# Patient Record
Sex: FEMALE | Race: WHITE | NOT HISPANIC OR LATINO | Employment: FULL TIME | URBAN - METROPOLITAN AREA
[De-identification: names, ages, dates, MRNs, and addresses within clinical notes are randomized per-mention and may not be internally consistent; named-entity substitution may affect disease eponyms.]

---

## 2019-02-01 ENCOUNTER — OFFICE VISIT (OUTPATIENT)
Dept: OBSTETRICS AND GYNECOLOGY | Facility: CLINIC | Age: 29
End: 2019-02-01

## 2019-02-01 VITALS
HEIGHT: 64 IN | BODY MASS INDEX: 32.1 KG/M2 | DIASTOLIC BLOOD PRESSURE: 60 MMHG | SYSTOLIC BLOOD PRESSURE: 99 MMHG | WEIGHT: 188 LBS | HEART RATE: 58 BPM

## 2019-02-01 DIAGNOSIS — Z01.419 ENCOUNTER FOR GYNECOLOGICAL EXAMINATION: Primary | ICD-10-CM

## 2019-02-01 DIAGNOSIS — N97.9 SECONDARY FEMALE INFERTILITY: ICD-10-CM

## 2019-02-01 DIAGNOSIS — N73.0 PID (ACUTE PELVIC INFLAMMATORY DISEASE): ICD-10-CM

## 2019-02-01 DIAGNOSIS — B37.31 VAGINA, CANDIDIASIS: ICD-10-CM

## 2019-02-01 DIAGNOSIS — Z71.6 ENCOUNTER FOR TOBACCO USE CESSATION COUNSELING: ICD-10-CM

## 2019-02-01 DIAGNOSIS — Z12.4 SCREENING FOR CERVICAL CANCER: ICD-10-CM

## 2019-02-01 DIAGNOSIS — Z11.3 SCREEN FOR STD (SEXUALLY TRANSMITTED DISEASE): ICD-10-CM

## 2019-02-01 PROBLEM — Z86.19 HISTORY OF CHLAMYDIA: Status: ACTIVE | Noted: 2019-02-01

## 2019-02-01 PROCEDURE — 99213 OFFICE O/P EST LOW 20 MIN: CPT | Performed by: OBSTETRICS & GYNECOLOGY

## 2019-02-01 PROCEDURE — 99385 PREV VISIT NEW AGE 18-39: CPT | Performed by: OBSTETRICS & GYNECOLOGY

## 2019-02-01 PROCEDURE — 99406 BEHAV CHNG SMOKING 3-10 MIN: CPT | Performed by: OBSTETRICS & GYNECOLOGY

## 2019-02-01 PROCEDURE — 96372 THER/PROPH/DIAG INJ SC/IM: CPT | Performed by: OBSTETRICS & GYNECOLOGY

## 2019-02-01 RX ORDER — METRONIDAZOLE 500 MG/1
500 TABLET ORAL 2 TIMES DAILY
Qty: 28 TABLET | Refills: 0 | Status: SHIPPED | OUTPATIENT
Start: 2019-02-01 | End: 2019-02-15

## 2019-02-01 RX ORDER — CEFTRIAXONE SODIUM 250 MG/1
250 INJECTION, POWDER, FOR SOLUTION INTRAMUSCULAR; INTRAVENOUS ONCE
Status: COMPLETED | OUTPATIENT
Start: 2019-02-01 | End: 2019-02-01

## 2019-02-01 RX ORDER — DOXYCYCLINE HYCLATE 100 MG/1
100 CAPSULE ORAL 2 TIMES DAILY
Qty: 28 CAPSULE | Refills: 0 | Status: SHIPPED | OUTPATIENT
Start: 2019-02-01 | End: 2019-02-15

## 2019-02-01 RX ORDER — FLUCONAZOLE 150 MG/1
150 TABLET ORAL
Qty: 2 TABLET | Refills: 0 | Status: SHIPPED | OUTPATIENT
Start: 2019-02-01 | End: 2019-02-05

## 2019-02-01 RX ADMIN — CEFTRIAXONE SODIUM 250 MG: 250 INJECTION, POWDER, FOR SOLUTION INTRAMUSCULAR; INTRAVENOUS at 11:41

## 2019-02-01 NOTE — PROGRESS NOTES
Jose L Perry is a 28 y.o. female.   Chief complaint: Annual visit, establish care, infertility  History of Present Illness   Patient is here as a new patient to establish care.  She also has concerns about infertility.  [facility]  She says she last saw him about 2 years ago.  She states they started some workup for until infertility including some blood work and an ultrasound, but she did not want to continue care there.  She says her last Pap was at least 2 years ago.  She does have a history of a chlamydia infection about 4 years ago.  I was also was with her current partner.  She reports that they're both treated.  The patient denies any known history of abnormal Pap smears.  She currently smokes marijuana, as does her partner.  The patient reports problems with painful intercourse.  She reports mostly pain with deep penetration.  She states her periods are very regular.  They typically last about 2-3 days.  She has predictable signs leading up to her menses as well.  She has never tried any home ovulatory kits.  She states that her mother has a history of pelvic scarring that required surgery prior to conceiving her.  She is uncertain of her mother had endometriosis or not.  Patient reports that her partner is roughly 30 years old.  He is generally in good health but does smoke marijuana.  He has fathered children in the past.  The patient has had 1 pregnancy in the past with her current partner.  That pregnancy resulted in a miscarriage.    Obstetric History       T0      L0     SAB1   TAB0   Ectopic0   Molar0   Multiple0   Live Births0       # Outcome Date GA Lbr Janes/2nd Weight Sex Delivery Anes PTL Lv   1 SAB                 Past Medical History:   Diagnosis Date   • Chlamydia      History reviewed. No pertinent surgical history.     History reviewed. No pertinent family history.     Social History     Tobacco Use   • Smoking status:  "Current Some Day Smoker     Types: Electronic Cigarette   • Smokeless tobacco: Never Used   Substance Use Topics   • Alcohol use: Yes     Comment: rarely   • Drug use: Yes     Types: Marijuana     No current outpatient medications on file prior to visit.     No current facility-administered medications on file prior to visit.      No Known Allergies    Review of Systems  General: No fever or chills  Constitutional: No weight loss or gain, no hair loss  HENT: No headache, no hearing loss, no tinnitus  Eyes: normal vision, no eye pain  Lungs: No cough, no shortness of breath  Heart: No chest pain, no palpitations  Abdomen: No nausea, vomiting, constipation or diarrhea  : No dysuria, no hematuria  Skin: No rashes  Lymph: No swelling  Neuro: No parathesia, no weakness  Psych: Normal though content, no hallucinations, no SI/HI    Objective   Physical Exam  Vitals:    02/01/19 1042   BP: 99/60   Pulse: 58   Weight: 85.3 kg (188 lb)   Height: 162.6 cm (64\")     Exam performed in the presence of a female chaperone  Patient has provided verbal consent to proceed with exam.    Gen: No acute distress, awake and oriented times three  HENT: Normocephalic, atraumatic, Moist mucous membranes  Eyes: PERRLA, EOMI  Neck: Supple, normal range of motion, no thyromegaly  Lungs: Normal work of breathing, lungs clear bilaterally, no crackles/wheezes  Heart: Regular rate and rhythm, no murmurs  Breast: Symmetrical. No skin changes or nipple retractions. No lumps or masses bilaterally. No tenderness bilaterally.  Abdomen: soft, nontender, no masses or hernia, no hepatosplenomegaly, non distended, normoactive bowel sounds  Pelvic: Exam performed in the presence of a female chaperone  Patient has provided verbal consent to proceed with exam.  Normal external female genitalia, no lesions  Urethra: Normal meatus, no caruncle  Bladder: nontender  Vagina: No blood; yellow/mucopurulent discharge noted  Cervix: Positive cervical motion " tenderness, no lesions, no active bleeding, cervix is friable, mucopurulent discharge noted  Uterus: Anteverted, normal size and shape, mildly tender  Adnexa: No masses; there is mild tenderness in the right adnexa, there is more significant tenderness in the left adnexa  External anal exam: Normal appearance, no lesions or hemorrhoids  Rectal: Deferred  Skin: Warm and dry, no rashes  Psych: Good judgement and insight, normal affect and mood  Neuro: CN 2-12 intact, no gross deficits    Assessment/Plan   Chuyita was seen today for new patient.    Diagnoses and all orders for this visit:    Encounter for gynecological examination  Pap smear and STD cultures performed today.  Patient declines STD blood work.  Discussed the importance of self breast exams and yearly gynecologic exams.  Screening for cervical cancer  -     IGP,CtNgTv,rfx Apt HPV All    Screen for STD (sexually transmitted disease)  -     IGP,CtNgTv,rfx Apt HPV All    PID (acute pelvic inflammatory disease)  -     cefTRIAXone (ROCEPHIN) injection 250 mg; Inject 250 mg into the appropriate muscle as directed by prescriber 1 (One) Time.  -     metroNIDAZOLE (FLAGYL) 500 MG tablet; Take 1 tablet by mouth 2 (Two) Times a Day for 14 days.  -     doxycycline (VIBRAMYCIN) 100 MG capsule; Take 1 capsule by mouth 2 (Two) Times a Day for 14 days.  Patient's findings on exam today are concerning for pelvic inflammatory disease.  This could be a reason for her painful intercourse.  I would recommend immediate treatment today, even though the patient is just here for an annual exam.  Patient has a history of chlamydia infection years ago and may or may not have issues with endometriosis based on symptoms.  Patient could have some significant tubal adhesions as a result of either prior Chlamydia or possibly endometriosis that may put her at risk for hydrosalpinx or pelvic inflammatory disease.  We discussed the importance of continuing the full 2 weeks of therapy.  I  will see her back in about 2 weeks to see if her symptoms improve after treatment.  Vagina, candidiasis  -     fluconazole (DIFLUCAN) 150 MG tablet; Take 1 tablet by mouth Every 3 (Three) Days for 2 doses.    Patient has some concerns about fertility.  I explained that a full evaluation of her infertility issues warrants another visit to address.  Patient does have a risk factor for tubal factor infertility including previous chlamydia infections and possible PID today.  Best place for this patient to start may be with hysterosalpingogram, but I would recommend completing treatment for PID first.  We will consider possible evaluation with hysterosalpingogram after her next visit.    Patient does smoke marijuana.  I have counseled her today about the risks of smoking and the weighted smoking affects her fertility rates as well as her long-term health.  I advised the patient to discontinue the use of all tobacco and marijuana products.  Greater than 3 minutes was spent in tobacco cessation counseling efforts.    I advised Chuyita of the risks of continuing to use tobacco, and I provided her with tobacco cessation educational materials in the After Visit Summary.

## 2019-02-04 ENCOUNTER — TELEPHONE (OUTPATIENT)
Dept: OBSTETRICS AND GYNECOLOGY | Facility: CLINIC | Age: 29
End: 2019-02-04

## 2019-02-04 NOTE — TELEPHONE ENCOUNTER
I spoke with Charlene Gonzalez in billing trying to find out for the patient how much the hysterosalpingogram procedure would be. She said CPT codes would make for a more accurate estimate. I explained the test hasn't been officially ordered yet so I wasn't for sure if she was definitely going to have the procedure or if we were needing to get estimated costs to help determine if she would have the test. Also, would we be using PID as the primary diagnosis and infertility as the secondary diagnosis? She said after I get these procedure codes, I could e-mail Mirtha Chaparro in coding so she could help me with how much is the allowable amount on the patient's insurance.

## 2019-02-05 LAB
C TRACH RRNA CVX QL NAA+PROBE: NEGATIVE
CONV .: NORMAL
CYTOLOGIST CVX/VAG CYTO: NORMAL
CYTOLOGY CVX/VAG DOC THIN PREP: NORMAL
DX ICD CODE: NORMAL
HIV 1 & 2 AB SER-IMP: NORMAL
N GONORRHOEA RRNA CVX QL NAA+PROBE: NEGATIVE
OTHER STN SPEC: NORMAL
PATH REPORT.FINAL DX SPEC: NORMAL
STAT OF ADQ CVX/VAG CYTO-IMP: NORMAL
T VAGINALIS RRNA SPEC QL NAA+PROBE: NEGATIVE

## 2019-02-07 ENCOUNTER — TELEPHONE (OUTPATIENT)
Dept: OBSTETRICS AND GYNECOLOGY | Facility: CLINIC | Age: 29
End: 2019-02-07

## 2019-02-07 DIAGNOSIS — R11.0 NAUSEA: Primary | ICD-10-CM

## 2019-02-07 RX ORDER — ONDANSETRON 4 MG/1
4 TABLET, ORALLY DISINTEGRATING ORAL EVERY 8 HOURS PRN
Qty: 15 TABLET | Refills: 1 | Status: SHIPPED | OUTPATIENT
Start: 2019-02-07 | End: 2019-02-22

## 2019-02-07 NOTE — TELEPHONE ENCOUNTER
----- Message from David Broderick MD sent at 2/5/2019 11:53 AM EST -----  Notify the patient that her Pap smear and gonorrhea and chlamydia tests were negative

## 2019-02-07 NOTE — TELEPHONE ENCOUNTER
Pt agrees to continue on rx'd medications, since you reassured her they are normal side effects.  She is requesting rx for Zofran to help with the nausea, if you agree with that.  States she cannot take phenergan due to it making her too sleepy.  Please advise.    Pt # [phone]

## 2019-02-07 NOTE — TELEPHONE ENCOUNTER
Nausea is a common side effect for metronidazole for doxycycline.  Dizziness can also be caused by these medications.  Unfortunately, these really are the best medications to treat her condition.  She may try taking these medications with food if she has not already.  If she can tolerate the side effects, I would encourage her to try to stay on the medication.  There are alternative medications, but they're not as well studied or proving to treat her condition.  I would also not necessarily no initially which medication to discontinue first.  If she feels that she cannot manage the side effects, I would have her continue the metronidazole, and stop the doxycycline and start azithromycin.

## 2019-02-07 NOTE — TELEPHONE ENCOUNTER
Pt called stating she is currently taking the Flagyl and doxycycline (VIBRAMYCIN) 100 MG capsule. She said she is having some dizziness and nausea while taking these medications. Pt is concerned.    Please advise.    Pt # is [phone]

## 2019-02-08 NOTE — TELEPHONE ENCOUNTER
Discussed this yesterday. CPT codes obtained. Yes, could use history of PID as primary Dx and infertility as secondary diagnosis

## 2019-02-20 NOTE — TELEPHONE ENCOUNTER
I never received a response from Mirtha Chaparro in coding so I spoke e-mailed our . She forwarded the e-mail to our  who gave a quote out of Epic of $362.53. Pt is aware.

## 2019-02-22 ENCOUNTER — OFFICE VISIT (OUTPATIENT)
Dept: OBSTETRICS AND GYNECOLOGY | Facility: CLINIC | Age: 29
End: 2019-02-22

## 2019-02-22 VITALS
HEIGHT: 64 IN | WEIGHT: 180 LBS | DIASTOLIC BLOOD PRESSURE: 72 MMHG | HEART RATE: 98 BPM | SYSTOLIC BLOOD PRESSURE: 110 MMHG | BODY MASS INDEX: 30.73 KG/M2

## 2019-02-22 DIAGNOSIS — N97.9 SECONDARY FEMALE INFERTILITY: ICD-10-CM

## 2019-02-22 DIAGNOSIS — Z87.42 HISTORY OF PID: ICD-10-CM

## 2019-02-22 DIAGNOSIS — N90.89 VULVAL LESION: ICD-10-CM

## 2019-02-22 DIAGNOSIS — B37.31 CANDIDAL VULVOVAGINITIS: ICD-10-CM

## 2019-02-22 DIAGNOSIS — N73.0 PID (ACUTE PELVIC INFLAMMATORY DISEASE): Primary | ICD-10-CM

## 2019-02-22 PROCEDURE — 99214 OFFICE O/P EST MOD 30 MIN: CPT | Performed by: OBSTETRICS & GYNECOLOGY

## 2019-02-22 RX ORDER — CLOTRIMAZOLE AND BETAMETHASONE DIPROPIONATE 10; .64 MG/G; MG/G
CREAM TOPICAL 2 TIMES DAILY
Qty: 45 G | Refills: 0 | Status: SHIPPED | OUTPATIENT
Start: 2019-02-22

## 2019-02-22 RX ORDER — FLUCONAZOLE 150 MG/1
150 TABLET ORAL
Qty: 2 TABLET | Refills: 0 | Status: SHIPPED | OUTPATIENT
Start: 2019-02-22 | End: 2019-02-26

## 2019-02-22 NOTE — PROGRESS NOTES
"Jose L Perry is a 28 y.o. female.   CC: pt here for follow-up of PID  History of Present Illness   Patient is here for follow-up to the diagnosis of PID.  She has completed her antibiotic course of doxycycline and metronidazole.  She reports that she feels she has a severe yeast infection at this time.  She reports bilateral labial and vulvar swelling and itching.  She also reports having some cracks and very tender areas around the labial folds.  She reports significant white discharge.  She does state that her lower abdominal pain has been better.  Patient has decided that she would like to proceed with hysterosalpingogram.  She will call the hospital to get this scheduled.  She denies fevers and chills.  No nausea or vomiting.    No current outpatient medications on file prior to visit.     No current facility-administered medications on file prior to visit.      No Known Allergies    The following portions of the patient's history were reviewed and updated as appropriate: allergies, current medications, past family history, past medical history, past social history, past surgical history and problem list.    Review of Systems  General: No fever or chills  Constitutional: No weight loss or gain, no hair loss  Abdomen: No nausea, vomiting, constipation or diarrhea  : No dysuria, no hematuria  Psych: Normal though content, no hallucinations, no SI/HI    Objective   Physical Exam  Vitals:    02/22/19 1203   BP: 110/72   Pulse: 98   Weight: 81.6 kg (180 lb)   Height: 162.6 cm (64\")     Gen: No acute distress, awake and oriented times three  Abdomen: soft, nontender, no masses or hernia, no hepatosplenomegaly, non distended, normoactive bowel sounds  Pelvic: Exam performed in the presence of a female chaperone  Patient has provided verbal consent to proceed with exam.  Normal external female genitalia, bilateral labia majora slightly erythematous and edematous.  Also in the folds between the labia " minora and labia majora are several small cracks/fissures that are likely consistent with candidal infection.  Cultures were obtained to exclude HSV.  Urethra: Normal meatus, no caruncle  Bladder: nontender  Vagina: No blood; moderate amount of thick white discharge noted  Cervix: No cervical motion tenderness, no lesions, no active bleeding, nonfriable  Uterus: Anteverted, normal size and shape, nontender  Adnexa: No masses or tenderness  Of note, bimanual exam is markedly improved from her last visit.  External anal exam: Normal appearance, no lesions or hemorrhoids  Rectal: Deferred  Psych: Good judgement and insight, normal affect and mood      Assessment/Plan   Diagnoses and all orders for this visit:    PID (acute pelvic inflammatory disease) - resolved  Pelvic exam much less painful today.  I do suspect that the patient may have had a chronic underlying PID, for which she was treated with broad-spectrum antibiotics with ceftriaxone, doxycycline, metronidazole.  Recent gonorrhea and Chlamydia testing was negative, but the patient does have a remote history of chlamydia which may have been a contributing factor.  Additionally, patient has a questionable history of endometriosis.  Her mother also had what sounds like endometriosis and required laparoscopic surgery prior to conception.  Candidal vulvovaginitis  Patient appears to have a rather severe candidal vulvovaginal infection today.  There are some excoriations and lesions on the external vulva, but I feel it is most likely these are related to candidal infection.  We will do another course of fluconazole and treat topically externally with Lotrisone cream.  I did obtain cultures for HSV, but I feel this is less likely.  Vulval lesion  HSV cultures.  History of PID  -     [place] hysterosalpingogram; Future  Patient has been adequately treated for her PID.  She can now proceed with hysterosalpingogram to try to check for tubal patency.  If the tubes are open, I  feel that another 3-6 months of trying to conceive would be warranted.  If the tubes are blocked, she would need referral to [facility].  Secondary female infertility  -     [place] hysterosalpingogram; Future  See above.  Other orders  -     clotrimazole-betamethasone (LOTRISONE) 1-0.05 % cream; Apply  topically to the appropriate area as directed 2 (Two) Times a Day.  -     fluconazole (DIFLUCAN) 150 MG tablet; Take 1 tablet by mouth Every 3 (Three) Days for 2 doses.

## 2019-02-25 LAB
HSV1 DNA SPEC QL NAA+PROBE: NEGATIVE
HSV2 DNA SPEC QL NAA+PROBE: NEGATIVE

## 2019-03-07 ENCOUNTER — TELEPHONE (OUTPATIENT)
Dept: OBSTETRICS AND GYNECOLOGY | Facility: CLINIC | Age: 29
End: 2019-03-07

## 2019-03-07 NOTE — TELEPHONE ENCOUNTER
PT will cancel the appt at the Hospital. She is still concerned about her cycle. She states that she is always on time for her cycle.  Pt would like some advice on what she can do.

## 2019-03-07 NOTE — TELEPHONE ENCOUNTER
She needs to call the hospital/rqadiology department.  They will not likely do an HSG until she has started her cycle.

## 2019-03-07 NOTE — TELEPHONE ENCOUNTER
Pt called stating she is scheduled for the HSG test tomorrow at [facility] and hasn't started her cycle this month. She said she took a pregnancy test and it was negative. Pt is concerned what she needs to do now.    Please advise.    Pt # is [phone]

## 2019-03-07 NOTE — TELEPHONE ENCOUNTER
Wait for her period to start and then call the hospital. If she hasn't had a period after another two weeks, she may take a pregnancy test again. If it is still negative, she may make an appt to see me in the office.

## 2019-03-08 ENCOUNTER — APPOINTMENT (OUTPATIENT)
Dept: GENERAL RADIOLOGY | Facility: HOSPITAL | Age: 29
End: 2019-03-08

## 2019-11-06 ENCOUNTER — TELEPHONE (OUTPATIENT)
Dept: OBSTETRICS AND GYNECOLOGY | Facility: CLINIC | Age: 29
End: 2019-11-06

## 2019-11-06 NOTE — TELEPHONE ENCOUNTER
Called pt about n/s on 11/4/19. Pt states this appointment was made from her PCP and they also scheduled her with another doctor.  She thought they canceled this appointment.[email or web address]

## 2021-04-16 ENCOUNTER — BULK ORDERING (OUTPATIENT)
Dept: CASE MANAGEMENT | Facility: OTHER | Age: 31
End: 2021-04-16

## 2021-04-16 DIAGNOSIS — Z23 IMMUNIZATION DUE: ICD-10-CM
